# Patient Record
Sex: MALE | Race: BLACK OR AFRICAN AMERICAN | NOT HISPANIC OR LATINO | URBAN - METROPOLITAN AREA
[De-identification: names, ages, dates, MRNs, and addresses within clinical notes are randomized per-mention and may not be internally consistent; named-entity substitution may affect disease eponyms.]

---

## 2022-03-15 ENCOUNTER — OFFICE VISIT (OUTPATIENT)
Dept: DERMATOLOGY | Facility: CLINIC | Age: 47
End: 2022-03-15

## 2022-03-15 DIAGNOSIS — L98.9 DERMATOSIS: Primary | ICD-10-CM

## 2022-03-15 PROCEDURE — 99999 PR PBB SHADOW E&M-NEW PATIENT-LVL III: CPT | Mod: PBBFAC,,, | Performed by: DERMATOLOGY

## 2022-03-15 PROCEDURE — 99202 OFFICE O/P NEW SF 15 MIN: CPT | Mod: S$PBB,,, | Performed by: DERMATOLOGY

## 2022-03-15 PROCEDURE — 99203 OFFICE O/P NEW LOW 30 MIN: CPT | Mod: PBBFAC | Performed by: DERMATOLOGY

## 2022-03-15 PROCEDURE — 99999 PR PBB SHADOW E&M-NEW PATIENT-LVL III: ICD-10-PCS | Mod: PBBFAC,,, | Performed by: DERMATOLOGY

## 2022-03-15 PROCEDURE — 99202 PR OFFICE/OUTPT VISIT, NEW, LEVL II, 15-29 MIN: ICD-10-PCS | Mod: S$PBB,,, | Performed by: DERMATOLOGY

## 2022-03-15 RX ORDER — HYDROXYZINE HYDROCHLORIDE 10 MG/1
10 TABLET, FILM COATED ORAL NIGHTLY PRN
Qty: 30 TABLET | Refills: 3 | Status: SHIPPED | OUTPATIENT
Start: 2022-03-15 | End: 2022-04-14

## 2022-03-15 RX ORDER — LEVOCETIRIZINE DIHYDROCHLORIDE 5 MG/1
TABLET, FILM COATED ORAL
Qty: 30 TABLET | Refills: 11 | Status: SHIPPED | OUTPATIENT
Start: 2022-03-15 | End: 2023-01-25

## 2022-03-15 RX ORDER — METHYLPREDNISOLONE 4 MG/1
TABLET ORAL
Qty: 21 EACH | Refills: 0 | Status: SHIPPED | OUTPATIENT
Start: 2022-03-15 | End: 2022-04-05

## 2022-03-15 RX ORDER — TRIAMCINOLONE ACETONIDE 1 MG/G
OINTMENT TOPICAL
Qty: 454 G | Refills: 1 | Status: SHIPPED | OUTPATIENT
Start: 2022-03-15

## 2022-03-15 RX ORDER — TRIAMCINOLONE ACETONIDE 0.25 MG/G
OINTMENT TOPICAL 2 TIMES DAILY PRN
Qty: 80 G | Refills: 1 | Status: SHIPPED | OUTPATIENT
Start: 2022-03-15

## 2022-03-15 NOTE — PATIENT INSTRUCTIONS
New Skin Care Regimen  Soap: Dove sensitive skin bar or liquid  Moisturizer: vanicream  Detergent: Tide Free, All Free, or Cheer Free   Fabric softener: do not use  Colognes/Perfumes/Fragrances: do not use  Bathing: shower or bathe with lukewarm water < 10 minutes

## 2022-03-15 NOTE — PROGRESS NOTES
Subjective:       Patient ID:  Federico Meza is a 46 y.o. male who presents for   Chief Complaint   Patient presents with    Rash     C/o rash all over body, itching     History of Present Illness: The patient presents with chief complaint of rash.  Location: generalized  Duration: 3 weeks  Signs/Symptoms: pruritus    Prior treatments: none    Current Skin Care Regimen  Soap: dial, dove exfoliating  Moisturizer: aveeno eczema, vaseline care   Detergent: all free and clear previously tide sport   Fabric softener: gain  Colognes/Perfumes/Fragrances: none  Bathing: showers, 20 min, hot water          Review of Systems   Constitutional: Negative for fever and chills.   Gastrointestinal: Negative for nausea and vomiting.   Skin: Positive for itching, rash, dry skin and activity-related sunscreen use. Negative for daily sunscreen use and recent sunburn.   Hematologic/Lymphatic: Does not bruise/bleed easily.        Objective:    Physical Exam   Constitutional: He appears well-developed and well-nourished. No distress.   Neurological: He is alert and oriented to person, place, and time. He is not disoriented.   Psychiatric: He has a normal mood and affect.   Skin:   Areas Examined (abnormalities noted in diagram):   Scalp / Hair Palpated and Inspected  Head / Face Inspection Performed  Neck Inspection Performed  Chest / Axilla Inspection Performed  Abdomen Inspection Performed  Genitals / Buttocks / Groin Inspection Performed  Back Inspection Performed  RUE Inspected  LUE Inspection Performed  RLE Inspected  LLE Inspection Performed  Nails and Digits Inspection Performed        Assessment / Plan:        Dermatosis  -     levocetirizine (XYZAL) 5 MG tablet; Take 1 tablet each morning.  Dispense: 30 tablet; Refill: 11  -     hydrOXYzine HCL (ATARAX) 10 MG Tab; Take 1 tablet (10 mg total) by mouth nightly as needed (for itching). May cause drowsiness.  Do not drive or operate heavy machinery.  Dispense: 30 tablet; Refill:  3  -     triamcinolone acetonide 0.1% (KENALOG) 0.1 % ointment; AAA bid prn rash on body. Do not use on face, underarms or groin.  Dispense: 454 g; Refill: 1  -     triamcinolone acetonide 0.025% (KENALOG) 0.025 % Oint; Apply topically 2 (two) times daily as needed. Mild steroid.  Use for 1-2 weeks for face, underarms or groin.  Dispense: 80 g; Refill: 1  -     methylPREDNISolone (MEDROL DOSEPACK) 4 mg tablet; use as directed  Dispense: 21 each; Refill: 0  -     Recommend purchase above with [employer]. Will start above meds. Discussed change in soap, moisturizer, d/c fabric softener.              Follow up in about 6 weeks (around 4/26/2022).

## 2022-04-26 ENCOUNTER — OFFICE VISIT (OUTPATIENT)
Dept: DERMATOLOGY | Facility: CLINIC | Age: 47
End: 2022-04-26

## 2022-04-26 DIAGNOSIS — L29.9 GENERALIZED PRURITUS: Primary | ICD-10-CM

## 2022-04-26 PROCEDURE — 99213 OFFICE O/P EST LOW 20 MIN: CPT | Mod: S$PBB,,, | Performed by: DERMATOLOGY

## 2022-04-26 PROCEDURE — 99999 PR PBB SHADOW E&M-EST. PATIENT-LVL III: CPT | Mod: PBBFAC,,, | Performed by: DERMATOLOGY

## 2022-04-26 PROCEDURE — 99213 OFFICE O/P EST LOW 20 MIN: CPT | Mod: PBBFAC | Performed by: DERMATOLOGY

## 2022-04-26 PROCEDURE — 99999 PR PBB SHADOW E&M-EST. PATIENT-LVL III: ICD-10-PCS | Mod: PBBFAC,,, | Performed by: DERMATOLOGY

## 2022-04-26 PROCEDURE — 99213 PR OFFICE/OUTPT VISIT, EST, LEVL III, 20-29 MIN: ICD-10-PCS | Mod: S$PBB,,, | Performed by: DERMATOLOGY

## 2022-04-26 RX ORDER — HYDROXYZINE HYDROCHLORIDE 10 MG/1
10 TABLET, FILM COATED ORAL NIGHTLY PRN
Qty: 30 TABLET | Refills: 3 | Status: SHIPPED | OUTPATIENT
Start: 2022-04-26 | End: 2022-09-28

## 2022-04-26 NOTE — PROGRESS NOTES
Subjective:       Patient ID:  Federico Meza is a 46 y.o. male who presents for   Chief Complaint   Patient presents with    Rash     Rash f/u      Hx of generalized pruritus x several months, last seen on 3/15/22. He is s/p use of medrol dosepak, TAC 0.1% oint for the body and TAC 0.025% oint for the face 2-3 times/week. + improvement in pruritus, occasional pruritus of the axilla.       Current Skin Care Regimen  Soap: dove sensitive  Moisturizer: aveeno eczema  Detergent: all free and clear  Fabric softener:  none  Colognes/Perfumes/Fragrances: none  Bathing: showers, 10 min, lukewarm water          Review of Systems   Constitutional: Negative for fever and chills.   Gastrointestinal: Negative for nausea and vomiting.   Skin: Positive for itching, rash, dry skin and activity-related sunscreen use. Negative for daily sunscreen use and recent sunburn.   Hematologic/Lymphatic: Does not bruise/bleed easily.        Objective:    Physical Exam   Constitutional: He appears well-developed and well-nourished. No distress.   Neurological: He is alert and oriented to person, place, and time. He is not disoriented.   Psychiatric: He has a normal mood and affect.   Skin:   Areas Examined (abnormalities noted in diagram):   Head / Face Inspection Performed  Neck Inspection Performed  Chest / Axilla Inspection Performed  Abdomen Inspection Performed  Back Inspection Performed  RUE Inspected  LUE Inspection Performed  Nails and Digits Inspection Performed               Assessment / Plan:        Generalized pruritus  -     hydrOXYzine HCL (ATARAX) 10 MG Tab; Take 1 tablet (10 mg total) by mouth nightly as needed (for itching). May cause drowsiness.  Do not drive or operate heavy machinery.  Dispense: 30 tablet; Refill: 3  -     Continue xyzal, TAC 0.025% for folds and TAC 0.1% ointment for body.  Continue sensitive skin care, will re-send hydroxyzine.            Follow up in about 3 months (around 7/26/2022).

## 2023-09-14 ENCOUNTER — HOSPITAL ENCOUNTER (OUTPATIENT)
Dept: CARDIOLOGY | Facility: HOSPITAL | Age: 48
Discharge: HOME | End: 2023-09-14
Attending: STUDENT IN AN ORGANIZED HEALTH CARE EDUCATION/TRAINING PROGRAM

## 2023-09-14 ENCOUNTER — OFFICE VISIT (OUTPATIENT)
Dept: CARDIOLOGY | Facility: CLINIC | Age: 48
End: 2023-09-14

## 2023-09-14 VITALS
BODY MASS INDEX: 30.74 KG/M2 | DIASTOLIC BLOOD PRESSURE: 68 MMHG | HEART RATE: 78 BPM | WEIGHT: 219.56 LBS | SYSTOLIC BLOOD PRESSURE: 122 MMHG | HEIGHT: 71 IN

## 2023-09-14 DIAGNOSIS — R07.9 CHEST PAIN, UNSPECIFIED TYPE: ICD-10-CM

## 2023-09-14 DIAGNOSIS — R07.9 CHEST PAIN, UNSPECIFIED TYPE: Primary | ICD-10-CM

## 2023-09-14 DIAGNOSIS — E66.9 OBESITY (BMI 30.0-34.9): ICD-10-CM

## 2023-09-14 DIAGNOSIS — Z76.89 ESTABLISHING CARE WITH NEW DOCTOR, ENCOUNTER FOR: ICD-10-CM

## 2023-09-14 DIAGNOSIS — H54.7 VISION LOSS: Primary | ICD-10-CM

## 2023-09-14 PROCEDURE — 99203 OFFICE O/P NEW LOW 30 MIN: CPT | Mod: S$PBB,,, | Performed by: STUDENT IN AN ORGANIZED HEALTH CARE EDUCATION/TRAINING PROGRAM

## 2023-09-14 PROCEDURE — 99213 OFFICE O/P EST LOW 20 MIN: CPT | Mod: PBBFAC | Performed by: STUDENT IN AN ORGANIZED HEALTH CARE EDUCATION/TRAINING PROGRAM

## 2023-09-14 PROCEDURE — 99999 PR PBB SHADOW E&M-EST. PATIENT-LVL III: ICD-10-PCS | Mod: PBBFAC,,, | Performed by: STUDENT IN AN ORGANIZED HEALTH CARE EDUCATION/TRAINING PROGRAM

## 2023-09-14 PROCEDURE — 99203 PR OFFICE/OUTPT VISIT, NEW, LEVL III, 30-44 MIN: ICD-10-PCS | Mod: S$PBB,,, | Performed by: STUDENT IN AN ORGANIZED HEALTH CARE EDUCATION/TRAINING PROGRAM

## 2023-09-14 PROCEDURE — 99999 PR PBB SHADOW E&M-EST. PATIENT-LVL III: CPT | Mod: PBBFAC,,, | Performed by: STUDENT IN AN ORGANIZED HEALTH CARE EDUCATION/TRAINING PROGRAM

## 2023-09-14 PROCEDURE — 93010 EKG 12-LEAD: ICD-10-PCS | Mod: ,,, | Performed by: INTERNAL MEDICINE

## 2023-09-14 PROCEDURE — 93010 ELECTROCARDIOGRAM REPORT: CPT | Mod: ,,, | Performed by: INTERNAL MEDICINE

## 2023-09-14 PROCEDURE — 93005 ELECTROCARDIOGRAM TRACING: CPT

## 2023-09-14 NOTE — PROGRESS NOTES
Section of Cardiology                  Cardiac Clinic Note    Chief Complaint/Reason for consultation:  Vision loss      HPI:   Federico Meza is a 47 y.o. male with no significant medical history who comes into Cardiology Clinic for evaluation.        9/14/23  Reports losing his vision bilateral for 30-40 min about 1 month ago, went to ophthalmology who didn't see any issues  Has seen Neurology, workup is pending  At the time of vision loss, after moving furniture  Gets headaches at times   Used to exercise regularly prior to his symptoms  (used to workout with no symptoms)    Denies chest pain, SOB, dizziness, palpitations, PND   BP stable     Stopped smoking almost 1 year ago  ETOH occ     Denies DM, CVA, HTN    Family hx: HTN        EKG 9/14/23 NSR, no acute ST - T wave changes    ECHO  No results found for this or any previous visit.       STRESS TEST No results found for this or any previous visit.       LHC No results found for this or any previous visit.            ROS: All 10 systems reviewed. Please refer to the HPI for pertinent positives. All other systems negative.     Past Medical History  History reviewed. No pertinent past medical history.    Surgical History  History reviewed. No pertinent surgical history.       Allergies:   Review of patient's allergies indicates:  No Known Allergies    Social History:  Social History     Socioeconomic History    Marital status: Single   Tobacco Use    Smoking status: Former     Current packs/day: 1.00     Types: Cigarettes    Smokeless tobacco: Never   Substance and Sexual Activity    Alcohol use: Not Currently       Family History:  family history includes Hypertension in his brother and mother.    Home Medications:  Current Outpatient Medications on File Prior to Visit   Medication Sig Dispense Refill    hydrOXYzine HCL (ATARAX) 10 MG Tab TAKE 1 TABLET(10 MG) BY MOUTH EVERY NIGHT AS NEEDED FOR ITCHING. MAY CAUSE DROWSINESS. DO NOT DRIVE OR  "OPERATE HEAVY MACHINERY (Patient not taking: Reported on 9/14/2023) 30 tablet 3    levocetirizine (XYZAL) 5 MG tablet TAKE 1 TABLET BY MOUTH EVERY MORNING (Patient not taking: Reported on 9/14/2023) 90 tablet 0    triamcinolone acetonide 0.025% (KENALOG) 0.025 % Oint Apply topically 2 (two) times daily as needed. Mild steroid.  Use for 1-2 weeks for face, underarms or groin. (Patient not taking: Reported on 9/14/2023) 80 g 1    triamcinolone acetonide 0.1% (KENALOG) 0.1 % ointment AAA bid prn rash on body. Do not use on face, underarms or groin. (Patient not taking: Reported on 9/14/2023) 454 g 1     No current facility-administered medications on file prior to visit.       Physical exam:  /68 (BP Location: Left arm, Patient Position: Sitting, BP Method: Large (Manual))   Pulse 78   Ht 5' 11" (1.803 m)   Wt 99.6 kg (219 lb 9.3 oz)   BMI 30.62 kg/m²         General: Pt is a 47 y.o. year old male who is AAOx3, in NAD, is pleasant, well nourished, looks stated age  HEENT: PERRL, EOMI, Oral mucosa pink & moist  CVS  No abnormal cardiac pulsations noted on inspection. JVP not raised. The apical impulse is normal on palpation, and is located in the left 5th intercostal space in the mid - clavicular line. No palpable thrills or abnormal pulsations noted. RR, S1 - S2 heard, no murmurs, rubs or gallops appreciated.   PUL : CTA B/L. No wheezes/crackles heard   ABD : BS +, soft. No tenderness elicited   LE : No C/C/E. Distal Pulses palpable B/L         LABS:    Chemistry: No results found for: "NA", "K", "CL", "CO2", "BUN", "CREATININE", "GLUCOSE", "CALCIUM"  Cardiac Markers: No results found for: "CKTOTAL", "CKMB", "CKMBINDEX", "TROPONINI"  Cardiac Markers (Last 3): No results found for: "CKTOTAL", "CKMB", "CKMBINDEX", "TROPONINI"  CBC: No results found for: "WBC", "HGB", "HCT", "MCV", "PLT"  Lipids: No results found for: "CHOL", "TRIG", "HDL", "LDLDIRECT"  Coagulation: No results found for: "PT", "INR", " ""APTT"        Assessment        1. Vision loss    2. Obesity (BMI 30.0-34.9)         Plan:    Vision loss   Will need to follow up with Neurology  Can consider echocardiogram with bubble study   Can consider 14 day cardiac monitor to rule out AFib    Obesity, Body mass index is 30.62 kg/m².   Low salt, low fat diet  Exercise as tolerated, at least 30 min daily     This note was prepared using voice recognition system and is likely to have sound alike errors that may have been overlooked even after proofreading.     I have reviewed all pertinent chart information.  Plans and recommendations have been formulated under my direct supervision. All questions answered and patient voiced understanding.   If symptoms persist go to the ED.    RTC ian Araujo MD  Cardiology                                                                      GERD with  "

## 2023-09-22 ENCOUNTER — PATIENT MESSAGE (OUTPATIENT)
Dept: CARDIOLOGY | Facility: HOSPITAL | Age: 48
End: 2023-09-22